# Patient Record
Sex: FEMALE
[De-identification: names, ages, dates, MRNs, and addresses within clinical notes are randomized per-mention and may not be internally consistent; named-entity substitution may affect disease eponyms.]

---

## 2017-06-20 ENCOUNTER — TRANSCRIPTION ENCOUNTER (OUTPATIENT)
Age: 47
End: 2017-06-20

## 2022-06-30 DIAGNOSIS — G47.00 INSOMNIA, UNSPECIFIED: ICD-10-CM

## 2022-10-06 ENCOUNTER — APPOINTMENT (OUTPATIENT)
Dept: GASTROENTEROLOGY | Facility: CLINIC | Age: 52
End: 2022-10-06

## 2022-10-06 ENCOUNTER — NON-APPOINTMENT (OUTPATIENT)
Age: 52
End: 2022-10-06

## 2022-10-06 ENCOUNTER — TRANSCRIPTION ENCOUNTER (OUTPATIENT)
Age: 52
End: 2022-10-06

## 2022-10-06 VITALS
DIASTOLIC BLOOD PRESSURE: 65 MMHG | HEART RATE: 70 BPM | RESPIRATION RATE: 16 BRPM | SYSTOLIC BLOOD PRESSURE: 110 MMHG | BODY MASS INDEX: 20.62 KG/M2 | WEIGHT: 105 LBS | OXYGEN SATURATION: 98 % | HEIGHT: 60 IN

## 2022-10-06 DIAGNOSIS — Z78.9 OTHER SPECIFIED HEALTH STATUS: ICD-10-CM

## 2022-10-06 DIAGNOSIS — Q45.3 OTHER CONGENITAL MALFORMATIONS OF PANCREAS AND PANCREATIC DUCT: ICD-10-CM

## 2022-10-06 DIAGNOSIS — Z00.00 ENCOUNTER FOR GENERAL ADULT MEDICAL EXAMINATION W/OUT ABNORMAL FINDINGS: ICD-10-CM

## 2022-10-06 PROCEDURE — 99203 OFFICE O/P NEW LOW 30 MIN: CPT

## 2022-10-06 NOTE — ASSESSMENT
[FreeTextEntry1] : 52-year-old lady triple biologic failure (Humira, Entyvio, Remicade) doing well on Stelara for the past 2 years.  Slight symptoms for the past few days including looser stool than usual and a little bit of blood this morning after her run.  We are unclear what these mean because otherwise she is feeling well and the labs from May are normal.  Her last colonoscopy in September 2021 showed ulcerations in the cecum and pseudopolyps from 40 to 90 cm but with otherwise normal.

## 2022-10-06 NOTE — HISTORY OF PRESENT ILLNESS
[FreeTextEntry1] : 52-year-old lady history of Crohn's disease and pancreas divisum here to establish care.  Longstanding patient of mine at [facility].\par \par Initial dx:1999\par \par CD: no fistulae, strictures, abscesses, abscesses\par CD because skip lesions but always in colon\par \par Presentation:bloody diarrhea daily\par \par Meds failed: 6MP forever, then 5-6 y no meds at all no flares\par then starting flaring again after had kids\par 1 really bad flare - syncope, blood transfusion - back on 6MP\par Humira - Ab (,1y)\par Entyvio - level could not reach high enough level (<1y)\par Remicade - ?Ab (<1y)\par \par Current meds:Stelara maybe for past 2 y; q8\par Last date administered:8/17/22\par Last level checked:11/21 drug 8.4 Ab 0\par Last colonoscopy:9/21 \par Path + cecum (severely inflamed, ulcers seen), pseudopolyps 40-90\par TI, bx every 10 cm non-spf inflammation only\par Last imaging:MRE 6/22\par pancreas divisum\par all normal\par \par Surgery: no; C sections\par \par BM/D:7-9\par b/w 7-9 am 6-7 times\par then 1-2 times throughout day\par eats mostly in the am and afternoon\par BM/night:0\par Blood:once recently scant\par Mucus:once today - past couple days\par Weight loss:stable\par Fatigue:ok\par \par 15-20 miles a week (4-5 mi at a time)\par runs on treadmill when she can't outside\par \par Joint aches:no\par Skin issues:no\par Eye issues:no; goes regularly\par \par TB:5/22\par HBV:5/22\par \par 5/22/22 vit D 32 - has been taking v dilligently since then\par CMP nl\par Fe studies nl\par B12 283 (200-1100)\par Hb 13.7 MCV 93 plts 148\par TB -ve \par CRP 0.4\par ESR 2\par \par this am stomach bothering a little - pressure, some blood in toilet after run, red\par loose, scant pink in water - brought pic\par no new food no red food\par the other day a piece of asparagus in stool - this happens more when she is flaring\par \par 20 twins, 18

## 2022-10-07 ENCOUNTER — TRANSCRIPTION ENCOUNTER (OUTPATIENT)
Age: 52
End: 2022-10-07

## 2022-10-13 ENCOUNTER — TRANSCRIPTION ENCOUNTER (OUTPATIENT)
Age: 52
End: 2022-10-13

## 2022-10-13 LAB
25(OH)D3 SERPL-MCNC: 52.9 NG/ML
BASOPHILS # BLD AUTO: 0.03 K/UL
BASOPHILS NFR BLD AUTO: 0.4 %
CRP SERPL-MCNC: <3 MG/L
EOSINOPHIL # BLD AUTO: 0.03 K/UL
EOSINOPHIL NFR BLD AUTO: 0.4 %
ERYTHROCYTE [SEDIMENTATION RATE] IN BLOOD BY WESTERGREN METHOD: 5 MM/HR
HCT VFR BLD CALC: 40 %
HGB BLD-MCNC: 13.6 G/DL
IMM GRANULOCYTES NFR BLD AUTO: 0.3 %
LYMPHOCYTES # BLD AUTO: 1.24 K/UL
LYMPHOCYTES NFR BLD AUTO: 18.6 %
MAN DIFF?: NORMAL
MCHC RBC-ENTMCNC: 31.6 PG
MCHC RBC-ENTMCNC: 34 GM/DL
MCV RBC AUTO: 92.8 FL
MONOCYTES # BLD AUTO: 0.5 K/UL
MONOCYTES NFR BLD AUTO: 7.5 %
NEUTROPHILS # BLD AUTO: 4.86 K/UL
NEUTROPHILS NFR BLD AUTO: 72.8 %
PLATELET # BLD AUTO: 183 K/UL
RBC # BLD: 4.31 M/UL
RBC # FLD: 12.3 %
WBC # FLD AUTO: 6.68 K/UL

## 2022-10-20 ENCOUNTER — TRANSCRIPTION ENCOUNTER (OUTPATIENT)
Age: 52
End: 2022-10-20

## 2022-10-20 LAB — CALPROTECTIN FECAL: 36 UG/G

## 2022-10-21 ENCOUNTER — TRANSCRIPTION ENCOUNTER (OUTPATIENT)
Age: 52
End: 2022-10-21

## 2022-10-25 ENCOUNTER — TRANSCRIPTION ENCOUNTER (OUTPATIENT)
Age: 52
End: 2022-10-25

## 2022-10-25 LAB
PROMETHEUS ANSER UST: NORMAL
SERUM USTEKINUMAB CONCENTRATION: 7.7 UG/ML
USTEKINUMAB ANTIBODIES CONCENTRATION: < 1.6 U/ML

## 2022-10-31 ENCOUNTER — TRANSCRIPTION ENCOUNTER (OUTPATIENT)
Age: 52
End: 2022-10-31

## 2022-11-02 ENCOUNTER — TRANSCRIPTION ENCOUNTER (OUTPATIENT)
Age: 52
End: 2022-11-02

## 2022-11-03 ENCOUNTER — TRANSCRIPTION ENCOUNTER (OUTPATIENT)
Age: 52
End: 2022-11-03

## 2022-11-04 ENCOUNTER — TRANSCRIPTION ENCOUNTER (OUTPATIENT)
Age: 52
End: 2022-11-04

## 2022-11-04 RX ORDER — ZOLPIDEM TARTRATE 10 MG/1
10 TABLET ORAL
Qty: 30 | Refills: 3 | Status: ACTIVE | COMMUNITY
Start: 2022-11-04 | End: 1900-01-01

## 2022-11-04 RX ORDER — ZOLPIDEM TARTRATE 10 MG/1
10 TABLET ORAL
Qty: 30 | Refills: 3 | Status: COMPLETED | COMMUNITY
Start: 2022-06-30 | End: 2022-11-04

## 2022-11-07 ENCOUNTER — TRANSCRIPTION ENCOUNTER (OUTPATIENT)
Age: 52
End: 2022-11-07

## 2022-12-30 ENCOUNTER — TRANSCRIPTION ENCOUNTER (OUTPATIENT)
Age: 52
End: 2022-12-30

## 2023-03-01 ENCOUNTER — RX RENEWAL (OUTPATIENT)
Age: 53
End: 2023-03-01

## 2023-03-16 ENCOUNTER — TRANSCRIPTION ENCOUNTER (OUTPATIENT)
Age: 53
End: 2023-03-16

## 2023-03-16 RX ORDER — ZOLPIDEM TARTRATE 10 MG/1
10 TABLET ORAL
Qty: 30 | Refills: 3 | Status: ACTIVE | COMMUNITY
Start: 2023-03-16 | End: 1900-01-01

## 2023-06-08 ENCOUNTER — TRANSCRIPTION ENCOUNTER (OUTPATIENT)
Age: 53
End: 2023-06-08

## 2023-06-09 ENCOUNTER — TRANSCRIPTION ENCOUNTER (OUTPATIENT)
Age: 53
End: 2023-06-09

## 2023-06-14 ENCOUNTER — RX RENEWAL (OUTPATIENT)
Age: 53
End: 2023-06-14

## 2023-06-14 RX ORDER — USTEKINUMAB 90 MG/ML
INJECTION, SOLUTION SUBCUTANEOUS
Qty: 1 | Refills: 12 | Status: ACTIVE | COMMUNITY
Start: 2022-10-06 | End: 1900-01-01

## 2023-07-18 ENCOUNTER — TRANSCRIPTION ENCOUNTER (OUTPATIENT)
Age: 53
End: 2023-07-18

## 2023-08-28 ENCOUNTER — TRANSCRIPTION ENCOUNTER (OUTPATIENT)
Age: 53
End: 2023-08-28

## 2023-09-01 ENCOUNTER — TRANSCRIPTION ENCOUNTER (OUTPATIENT)
Age: 53
End: 2023-09-01

## 2023-09-25 ENCOUNTER — APPOINTMENT (OUTPATIENT)
Dept: GASTROENTEROLOGY | Facility: CLINIC | Age: 53
End: 2023-09-25
Payer: COMMERCIAL

## 2023-09-25 PROCEDURE — 99443: CPT

## 2023-10-19 ENCOUNTER — TRANSCRIPTION ENCOUNTER (OUTPATIENT)
Age: 53
End: 2023-10-19

## 2023-10-26 ENCOUNTER — TRANSCRIPTION ENCOUNTER (OUTPATIENT)
Age: 53
End: 2023-10-26

## 2023-10-27 ENCOUNTER — RX RENEWAL (OUTPATIENT)
Age: 53
End: 2023-10-27

## 2023-10-27 RX ORDER — ZOLPIDEM TARTRATE 10 MG/1
10 TABLET ORAL
Qty: 30 | Refills: 3 | Status: ACTIVE | COMMUNITY
Start: 2023-07-18 | End: 1900-01-01

## 2023-11-01 ENCOUNTER — TRANSCRIPTION ENCOUNTER (OUTPATIENT)
Age: 53
End: 2023-11-01

## 2023-11-01 LAB
25(OH)D3 SERPL-MCNC: 43.4 NG/ML
CRP SERPL-MCNC: <3 MG/L
ERYTHROCYTE [SEDIMENTATION RATE] IN BLOOD BY WESTERGREN METHOD: < 2 MM/HR
FERRITIN SERPL-MCNC: 353 NG/ML
HBV CORE IGM SER QL: NONREACTIVE
HBV SURFACE AB SER QL: NONREACTIVE
HBV SURFACE AG SER QL: NONREACTIVE

## 2023-11-02 ENCOUNTER — TRANSCRIPTION ENCOUNTER (OUTPATIENT)
Age: 53
End: 2023-11-02

## 2023-11-05 ENCOUNTER — TRANSCRIPTION ENCOUNTER (OUTPATIENT)
Age: 53
End: 2023-11-05

## 2023-11-05 LAB
CALPROTECTIN FECAL: 23 UG/G
M TB IFN-G BLD-IMP: NEGATIVE
QUANTIFERON TB PLUS MITOGEN MINUS NIL: 0.71 IU/ML
QUANTIFERON TB PLUS NIL: 0.02 IU/ML
QUANTIFERON TB PLUS TB1 MINUS NIL: -0.01 IU/ML
QUANTIFERON TB PLUS TB2 MINUS NIL: -0.01 IU/ML

## 2023-11-07 ENCOUNTER — TRANSCRIPTION ENCOUNTER (OUTPATIENT)
Age: 53
End: 2023-11-07

## 2023-11-07 LAB
PROMETHEUS ANSER UST: NORMAL
SERUM USTEKINUMAB CONCENTRATION: 10.5 UG/ML
USTEKINUMAB ANTIBODIES CONCENTRATION: < 1.6 U/ML

## 2023-11-08 ENCOUNTER — TRANSCRIPTION ENCOUNTER (OUTPATIENT)
Age: 53
End: 2023-11-08

## 2023-11-09 ENCOUNTER — TRANSCRIPTION ENCOUNTER (OUTPATIENT)
Age: 53
End: 2023-11-09

## 2023-11-20 ENCOUNTER — TRANSCRIPTION ENCOUNTER (OUTPATIENT)
Age: 53
End: 2023-11-20

## 2023-12-03 ENCOUNTER — TRANSCRIPTION ENCOUNTER (OUTPATIENT)
Age: 53
End: 2023-12-03

## 2023-12-07 RX ORDER — SODIUM SULFATE, MAGNESIUM SULFATE, AND POTASSIUM CHLORIDE 17.75; 2.7; 2.25 G/1; G/1; G/1
1479-225-188 TABLET ORAL
Qty: 24 | Refills: 0 | Status: ACTIVE | COMMUNITY
Start: 2023-09-25 | End: 1900-01-01

## 2024-01-22 ENCOUNTER — TRANSCRIPTION ENCOUNTER (OUTPATIENT)
Age: 54
End: 2024-01-22

## 2024-01-22 RX ORDER — NYSTATIN 100000 [USP'U]/ML
100000 SUSPENSION ORAL
Qty: 1 | Refills: 0 | Status: ACTIVE | COMMUNITY
Start: 2024-01-22 | End: 1900-01-01

## 2024-02-08 ENCOUNTER — TRANSCRIPTION ENCOUNTER (OUTPATIENT)
Age: 54
End: 2024-02-08

## 2024-02-09 ENCOUNTER — TRANSCRIPTION ENCOUNTER (OUTPATIENT)
Age: 54
End: 2024-02-09

## 2024-02-09 RX ORDER — ONDANSETRON 8 MG/1
8 TABLET, ORALLY DISINTEGRATING ORAL
Qty: 10 | Refills: 0 | Status: ACTIVE | COMMUNITY
Start: 2024-02-09 | End: 1900-01-01

## 2024-02-29 ENCOUNTER — TRANSCRIPTION ENCOUNTER (OUTPATIENT)
Age: 54
End: 2024-02-29

## 2024-03-01 ENCOUNTER — RESULT REVIEW (OUTPATIENT)
Age: 54
End: 2024-03-01

## 2024-03-01 ENCOUNTER — APPOINTMENT (OUTPATIENT)
Dept: GASTROENTEROLOGY | Facility: GI CENTER | Age: 54
End: 2024-03-01
Payer: COMMERCIAL

## 2024-03-01 ENCOUNTER — OUTPATIENT (OUTPATIENT)
Dept: OUTPATIENT SERVICES | Facility: HOSPITAL | Age: 54
LOS: 1 days | End: 2024-03-01
Payer: COMMERCIAL

## 2024-03-01 DIAGNOSIS — R12 HEARTBURN: ICD-10-CM

## 2024-03-01 DIAGNOSIS — K50.10 CROHN'S DISEASE OF LARGE INTESTINE WITHOUT COMPLICATIONS: ICD-10-CM

## 2024-03-01 PROCEDURE — 88342 IMHCHEM/IMCYTCHM 1ST ANTB: CPT | Mod: 26

## 2024-03-01 PROCEDURE — 88342 IMHCHEM/IMCYTCHM 1ST ANTB: CPT

## 2024-03-01 PROCEDURE — 45380 COLONOSCOPY AND BIOPSY: CPT

## 2024-03-01 PROCEDURE — 88305 TISSUE EXAM BY PATHOLOGIST: CPT | Mod: 26

## 2024-03-01 PROCEDURE — 43239 EGD BIOPSY SINGLE/MULTIPLE: CPT

## 2024-03-01 PROCEDURE — 88305 TISSUE EXAM BY PATHOLOGIST: CPT

## 2024-03-01 RX ORDER — OMEPRAZOLE 40 MG/1
40 CAPSULE, DELAYED RELEASE ORAL
Qty: 30 | Refills: 1 | Status: ACTIVE | COMMUNITY
Start: 2024-03-01 | End: 1900-01-01

## 2024-03-03 ENCOUNTER — RX RENEWAL (OUTPATIENT)
Age: 54
End: 2024-03-03

## 2024-03-03 RX ORDER — ZOLPIDEM TARTRATE 10 MG/1
10 TABLET ORAL
Qty: 30 | Refills: 3 | Status: ACTIVE | COMMUNITY
Start: 2023-11-20 | End: 1900-01-01

## 2024-03-06 LAB — SURGICAL PATHOLOGY STUDY: SIGNIFICANT CHANGE UP

## 2024-03-07 ENCOUNTER — TRANSCRIPTION ENCOUNTER (OUTPATIENT)
Age: 54
End: 2024-03-07

## 2024-03-28 ENCOUNTER — TRANSCRIPTION ENCOUNTER (OUTPATIENT)
Age: 54
End: 2024-03-28

## 2024-03-28 RX ORDER — ZOLPIDEM TARTRATE 10 MG/1
10 TABLET ORAL
Qty: 30 | Refills: 3 | Status: ACTIVE | COMMUNITY
Start: 2024-03-28 | End: 1900-01-01

## 2024-03-29 ENCOUNTER — TRANSCRIPTION ENCOUNTER (OUTPATIENT)
Age: 54
End: 2024-03-29

## 2024-04-04 NOTE — ASSESSMENT
[FreeTextEntry1] : 53 F CD panc divisum on Stelara, feels "great" though this means 6-10 bm/d, telephonic for f/u.

## 2024-04-04 NOTE — ADDENDUM
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 Pseudopolyps 40-90 cm;  Path Ileum nl 90 cm severely active ch colitis with ulceration 80 cm non-spf postinflammatory changes 70 cm normal 60 cm non-spf postinflammatory changes 50 cm non-spf postinflammatory changes 40 cm nl 30 cm nl 20 cm nl 10 cm non-spf postinflammatory changes  6/2020 Colon: pseudopolyps 40-80 cm. Poor prep.  Path: ileum nl 90 cm mod active ch colitis 80 cm inactive ch colitis + granuloma 70 cm inactive ch colitis 60 cm mildy active ch colitis 50 cm severely active ch colitis  40 cm inactive ch colitis  30 cm inactive ch colitis  20 cm inactive ch colitis rectum moderatively active ch colitis   10/2020 6MMP 70408, nl <500; 6TG 372 (nl) MRE 2022: pancreas divisum. No active bowel inflammation.  CT 2019: desc colon thickening new since last scan 8/2018. Persistent thickening of the ascending and transverse.

## 2024-04-04 NOTE — ASSESSMENT
[FreeTextEntry1] : 53 F CD, pancreas divisum, telephonic for f/u. On [facility].   The patient is medically optimized for procedure(s). All questions have been answered. The risks and benefits of the procedure have been discussed and the patient signed consent for the procedure. Preliminary results will be discussed when the patient wakes up from anesthesia, but definitive results will be discussed after the pathology report is issued in 5 business days.

## 2024-04-04 NOTE — PLAN
[TextEntry] : 1. labs 2. colon  3. bloat - t/c MRE earlier than 2024 if this continues; meanwhile recommended Visbiome and continued observation 4. still don't have [facility] chart, we have partial records from me copying them off her phone but she will look into getting whole chart transferred

## 2024-04-04 NOTE — HISTORY OF PRESENT ILLNESS
[FreeTextEntry1] : Visit type: Telephonic (phone only) Patient Location: [place], [place] Provider Location: [address] [place] [address] Consent obtained for visit:Yes Visit length: 30 minutes Others present: No  53 F CD, pancreas divisum, telephonic for f/u.   Initial dx:1999 CD: no fistulae, strictures, abscesses, abscesses CD because skip lesions but always in colon Presentation: bloody diarrhea daily, abdominal pain, weight loss Meds failed: 6MP forever, then 5-6 y no meds at all no flares; then starting flaring again after had kids; 1 really bad flare - syncope, blood transfusion - back on 6MP; Humira - Ab (,1y); Entyvio - primary non-responder (<1y); Remicade - ?Ab (<1y)  Current meds: Stelara (7/2020-; q8  Last date administered: 11 Sept 2022 (HA 2 days after last 3 infusions not this time)  Last level checked: Stelara 10/22 drug 7.7 Ab 0.0 Stelara 10/2020 drug 7.7 Ab <1.6 11/2021 drug 8.4 Ab <1.6  Entyvio level 12/18 21.1 Ab <1.6 1/2019 11.6 Ab 4.1 3/2019 drug 7.2 Ab <1.6 6/2019 drug 15.3 Ab <1.6 8/2019 drug 14.9 Ab <1.6  Remicade level 11/2019 drug >34 Ab <3.1 5/2020 remicade <1 Ab 50 6/2020 remicade  <1 Ab >100  Last colonoscopy:9/21  Path + cecum ( inflamed, ulcers seen), pseudopolyps 40-90  TI, bx every 10 cm non-spf inflammation only   9/2021 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 Pseudopolyps 40-90 cm;  Path Ileum nl 90 cm severely active ch colitis with ulceration 80 cm non-spf postinflammatory changes 70 cm normal 60 cm non-spf postinflammatory changes 50 cm non-spf postinflammatory changes 40 cm nl 30 cm nl 20 cm nl 10 cm non-spf postinflammatory changes  6/2020 Colon: pseudopolyps 40-80 cm. Poor prep.  Path: ileum nl 90 cm mod active ch colitis 80 cm inactive ch colitis + granuloma 70 cm inactive ch colitis 60 cm mildy active ch colitis 50 cm severely active ch colitis  40 cm inactive ch colitis  30 cm inactive ch colitis  20 cm inactive ch colitis rectum moderatively active ch colitis  Last imaging: MRE 6/22  pancreas divisum  all normal (no active bowel inflammation).   CT 2019: desc colon thickening new since last scan 8/2018. Persistent thickening of the ascending and transverse.  Surgery: no; C sections  BM/D:6-10  b/w 7-9 am 6-7 times  then 1-2 times throughout day  eats mostly in the am and afternoon; later she eats harder the next morning is  BM/night:0  Blood: no  Mucus: no  Weight loss: stable  Fatigue: ok    Joint aches:no  Skin issues:no  Eye issues:no; goes regularly    TB:5/22  HBV:5/22  10/22 nl CBC Hb 13.6,CRP <3, ESR 5 , calpro 36; vit D 52.9  bloated v frequently ?digestive enzymes no pain  [facility] records: Fe sat 2019 10% (nl >15%)   Calpro 9//2018 1387.3 12/18 1617 3/2019 456 10/2020 6MMP 64659, nl <500; 6TG 372 (nl)

## 2024-04-04 NOTE — HISTORY OF PRESENT ILLNESS
[FreeTextEntry1] :  53 F CD, pancreas divisum, [facility].

## 2024-05-09 ENCOUNTER — TRANSCRIPTION ENCOUNTER (OUTPATIENT)
Age: 54
End: 2024-05-09

## 2024-06-10 ENCOUNTER — TRANSCRIPTION ENCOUNTER (OUTPATIENT)
Age: 54
End: 2024-06-10

## 2024-06-12 ENCOUNTER — TRANSCRIPTION ENCOUNTER (OUTPATIENT)
Age: 54
End: 2024-06-12

## 2024-06-13 RX ORDER — USTEKINUMAB 90 MG/ML
90 INJECTION, SOLUTION SUBCUTANEOUS
Qty: 1 | Refills: 12 | Status: ACTIVE | COMMUNITY
Start: 2024-06-10 | End: 1900-01-01

## 2024-06-14 ENCOUNTER — TRANSCRIPTION ENCOUNTER (OUTPATIENT)
Age: 54
End: 2024-06-14

## 2024-06-14 LAB
ALBUMIN SERPL ELPH-MCNC: 4.9 G/DL
ALP BLD-CCNC: 90 U/L
ALT SERPL-CCNC: 22 U/L
ANION GAP SERPL CALC-SCNC: 16 MMOL/L
AST SERPL-CCNC: 33 U/L
BILIRUB SERPL-MCNC: 2.8 MG/DL
BUN SERPL-MCNC: 21 MG/DL
CALCIUM SERPL-MCNC: 10 MG/DL
CHLORIDE SERPL-SCNC: 100 MMOL/L
CO2 SERPL-SCNC: 24 MMOL/L
CREAT SERPL-MCNC: 1.01 MG/DL
EGFR: 66 ML/MIN/1.73M2
FERRITIN SERPL-MCNC: 629 NG/ML
GLUCOSE SERPL-MCNC: 104 MG/DL
HCT VFR BLD CALC: 42.8 %
HGB BLD-MCNC: 14.7 G/DL
IRON SATN MFR SERPL: 62 %
IRON SERPL-MCNC: 201 UG/DL
MCHC RBC-ENTMCNC: 30.9 PG
MCHC RBC-ENTMCNC: 34.3 GM/DL
MCV RBC AUTO: 89.9 FL
PLATELET # BLD AUTO: 181 K/UL
POTASSIUM SERPL-SCNC: 5.6 MMOL/L
PROT SERPL-MCNC: 7.2 G/DL
RBC # BLD: 4.76 M/UL
RBC # FLD: 12.1 %
SODIUM SERPL-SCNC: 140 MMOL/L
TIBC SERPL-MCNC: 327 UG/DL
UIBC SERPL-MCNC: 126 UG/DL
WBC # FLD AUTO: 6.74 K/UL

## 2024-06-15 ENCOUNTER — TRANSCRIPTION ENCOUNTER (OUTPATIENT)
Age: 54
End: 2024-06-15

## 2024-06-16 ENCOUNTER — TRANSCRIPTION ENCOUNTER (OUTPATIENT)
Age: 54
End: 2024-06-16

## 2024-06-17 ENCOUNTER — NON-APPOINTMENT (OUTPATIENT)
Age: 54
End: 2024-06-17

## 2024-06-21 ENCOUNTER — TRANSCRIPTION ENCOUNTER (OUTPATIENT)
Age: 54
End: 2024-06-21

## 2024-06-28 ENCOUNTER — APPOINTMENT (OUTPATIENT)
Dept: GASTROENTEROLOGY | Facility: CLINIC | Age: 54
End: 2024-06-28
Payer: COMMERCIAL

## 2024-06-28 ENCOUNTER — TRANSCRIPTION ENCOUNTER (OUTPATIENT)
Age: 54
End: 2024-06-28

## 2024-06-28 DIAGNOSIS — K50.10 CROHN'S DISEASE OF LARGE INTESTINE W/OUT COMPLICATIONS: ICD-10-CM

## 2024-06-28 DIAGNOSIS — E83.19 OTHER DISORDERS OF IRON METABOLISM: ICD-10-CM

## 2024-06-28 PROCEDURE — 99443: CPT

## 2024-07-03 ENCOUNTER — RX RENEWAL (OUTPATIENT)
Age: 54
End: 2024-07-03

## 2024-07-16 ENCOUNTER — TRANSCRIPTION ENCOUNTER (OUTPATIENT)
Age: 54
End: 2024-07-16

## 2024-07-18 ENCOUNTER — TRANSCRIPTION ENCOUNTER (OUTPATIENT)
Age: 54
End: 2024-07-18

## 2024-07-18 ENCOUNTER — RX RENEWAL (OUTPATIENT)
Age: 54
End: 2024-07-18

## 2024-07-26 ENCOUNTER — TRANSCRIPTION ENCOUNTER (OUTPATIENT)
Age: 54
End: 2024-07-26

## 2024-07-26 RX ORDER — ZOLPIDEM TARTRATE 10 MG/1
10 TABLET ORAL
Qty: 30 | Refills: 3 | Status: ACTIVE | COMMUNITY
Start: 2024-07-26 | End: 1900-01-01

## 2024-08-13 ENCOUNTER — OUTPATIENT (OUTPATIENT)
Dept: OUTPATIENT SERVICES | Facility: HOSPITAL | Age: 54
LOS: 1 days | Discharge: HOME | End: 2024-08-13

## 2024-08-13 DIAGNOSIS — E83.10 DISORDER OF IRON METABOLISM, UNSPECIFIED: ICD-10-CM

## 2024-08-21 ENCOUNTER — NON-APPOINTMENT (OUTPATIENT)
Age: 54
End: 2024-08-21

## 2024-08-22 ENCOUNTER — APPOINTMENT (OUTPATIENT)
Dept: HEMATOLOGY ONCOLOGY | Facility: CLINIC | Age: 54
End: 2024-08-22

## 2024-08-22 ENCOUNTER — RESULT REVIEW (OUTPATIENT)
Age: 54
End: 2024-08-22

## 2024-08-22 VITALS
HEART RATE: 64 BPM | WEIGHT: 105.16 LBS | TEMPERATURE: 97.9 F | DIASTOLIC BLOOD PRESSURE: 92 MMHG | SYSTOLIC BLOOD PRESSURE: 142 MMHG | RESPIRATION RATE: 16 BRPM | OXYGEN SATURATION: 98 % | BODY MASS INDEX: 21.49 KG/M2 | HEIGHT: 58.66 IN

## 2024-08-22 LAB
ALBUMIN SERPL ELPH-MCNC: 4.9 G/DL
ALP BLD-CCNC: 109 U/L
ALT SERPL-CCNC: 37 U/L
ANION GAP SERPL CALC-SCNC: 16 MMOL/L
AST SERPL-CCNC: 53 U/L
BASOPHILS # BLD AUTO: 0.04 K/UL — SIGNIFICANT CHANGE UP (ref 0–0.2)
BASOPHILS NFR BLD AUTO: 0.4 % — SIGNIFICANT CHANGE UP (ref 0–2)
BILIRUB SERPL-MCNC: 1.8 MG/DL
BUN SERPL-MCNC: 23 MG/DL
CALCIUM SERPL-MCNC: 10.2 MG/DL
CHLORIDE SERPL-SCNC: 101 MMOL/L
CO2 SERPL-SCNC: 25 MMOL/L
CREAT SERPL-MCNC: 0.88 MG/DL
CRP SERPL-MCNC: <3 MG/L
EGFR: 78 ML/MIN/1.73M2
EOSINOPHIL # BLD AUTO: 0.03 K/UL — SIGNIFICANT CHANGE UP (ref 0–0.5)
EOSINOPHIL NFR BLD AUTO: 0.3 % — SIGNIFICANT CHANGE UP (ref 0–6)
ERYTHROCYTE [SEDIMENTATION RATE] IN BLOOD: 6 MM/HR — SIGNIFICANT CHANGE UP (ref 0–20)
FERRITIN SERPL-MCNC: 410 NG/ML
GLUCOSE SERPL-MCNC: 104 MG/DL
HCT VFR BLD CALC: 40.5 % — SIGNIFICANT CHANGE UP (ref 34.5–45)
HGB BLD-MCNC: 13.9 G/DL — SIGNIFICANT CHANGE UP (ref 11.5–15.5)
IMM GRANULOCYTES NFR BLD AUTO: 0.2 % — SIGNIFICANT CHANGE UP (ref 0–0.9)
IRON SATN MFR SERPL: 29 %
IRON SERPL-MCNC: 91 UG/DL
LYMPHOCYTES # BLD AUTO: 1.61 K/UL — SIGNIFICANT CHANGE UP (ref 1–3.3)
LYMPHOCYTES # BLD AUTO: 17.5 % — SIGNIFICANT CHANGE UP (ref 13–44)
MCHC RBC-ENTMCNC: 31.1 PG — SIGNIFICANT CHANGE UP (ref 27–34)
MCHC RBC-ENTMCNC: 34.3 G/DL — SIGNIFICANT CHANGE UP (ref 32–36)
MCV RBC AUTO: 90.6 FL — SIGNIFICANT CHANGE UP (ref 80–100)
MONOCYTES # BLD AUTO: 0.72 K/UL — SIGNIFICANT CHANGE UP (ref 0–0.9)
MONOCYTES NFR BLD AUTO: 7.8 % — SIGNIFICANT CHANGE UP (ref 2–14)
NEUTROPHILS # BLD AUTO: 6.77 K/UL — SIGNIFICANT CHANGE UP (ref 1.8–7.4)
NEUTROPHILS NFR BLD AUTO: 73.8 % — SIGNIFICANT CHANGE UP (ref 43–77)
NRBC # BLD: 0 /100 WBCS — SIGNIFICANT CHANGE UP (ref 0–0)
NRBC BLD-RTO: 0 /100 WBCS — SIGNIFICANT CHANGE UP (ref 0–0)
PLATELET # BLD AUTO: 220 K/UL — SIGNIFICANT CHANGE UP (ref 150–400)
POTASSIUM SERPL-SCNC: 5.4 MMOL/L
PROT SERPL-MCNC: 7.4 G/DL
RBC # BLD: 4.47 M/UL — SIGNIFICANT CHANGE UP (ref 3.8–5.2)
RBC # FLD: 12.5 % — SIGNIFICANT CHANGE UP (ref 10.3–14.5)
SODIUM SERPL-SCNC: 142 MMOL/L
TIBC SERPL-MCNC: 313 UG/DL
UIBC SERPL-MCNC: 222 UG/DL
WBC # BLD: 9.19 K/UL — SIGNIFICANT CHANGE UP (ref 3.8–10.5)
WBC # FLD AUTO: 9.19 K/UL — SIGNIFICANT CHANGE UP (ref 3.8–10.5)

## 2024-08-22 PROCEDURE — 99204 OFFICE O/P NEW MOD 45 MIN: CPT

## 2024-08-22 NOTE — HISTORY OF PRESENT ILLNESS
[de-identified] : Initial Consultation on 8/22/2024 Referred by: Dr. Gonzalez, GI CC: Elevated iron level   HPI: 54-year-old woman with Crohn's Disease (diagnosed in 1999, stable on Stelara) and osteopenia here for evaluation of elevated iron level.    Recent CBC on 6/11/2024 showed WBC 6.74, HG 14.7, HCT 42.8, MCV 89.9 and ,000.   Ferritin level 629 on 6/11/2023     PMHx:   PSHx:   Past OB/Gyn:   Hospitalizations:   Medications: See List.  Medications reconciled   Allergies: NKDA   Social History: . Works as a Denies smoking Occasional alcohol use Born in [place] Parents are from   Family History: Mother Father   Healthcare Maintenance: Primary care doctor- Last colonoscopy- Last endoscopy- Last mammogram- Last gyn exam-

## 2024-08-23 LAB
HGB A MFR BLD: 97.9 %
HGB A2 MFR BLD: 2.1 %
HGB FRACT BLD-IMP: NORMAL
TM INTERPRETATION: NORMAL

## 2024-09-03 ENCOUNTER — NON-APPOINTMENT (OUTPATIENT)
Age: 54
End: 2024-09-03

## 2024-09-11 ENCOUNTER — TRANSCRIPTION ENCOUNTER (OUTPATIENT)
Age: 54
End: 2024-09-11

## 2024-09-13 ENCOUNTER — TRANSCRIPTION ENCOUNTER (OUTPATIENT)
Age: 54
End: 2024-09-13

## 2024-09-26 ENCOUNTER — TRANSCRIPTION ENCOUNTER (OUTPATIENT)
Age: 54
End: 2024-09-26

## 2024-10-15 ENCOUNTER — APPOINTMENT (OUTPATIENT)
Dept: MRI IMAGING | Facility: CLINIC | Age: 54
End: 2024-10-15
Payer: COMMERCIAL

## 2024-10-15 ENCOUNTER — OUTPATIENT (OUTPATIENT)
Dept: OUTPATIENT SERVICES | Facility: HOSPITAL | Age: 54
LOS: 1 days | End: 2024-10-15
Payer: COMMERCIAL

## 2024-10-15 ENCOUNTER — TRANSCRIPTION ENCOUNTER (OUTPATIENT)
Age: 54
End: 2024-10-15

## 2024-10-15 DIAGNOSIS — K50.10 CROHN'S DISEASE OF LARGE INTESTINE WITHOUT COMPLICATIONS: ICD-10-CM

## 2024-10-15 PROCEDURE — 74183 MRI ABD W/O CNTR FLWD CNTR: CPT | Mod: 26

## 2024-10-15 PROCEDURE — 72197 MRI PELVIS W/O & W/DYE: CPT

## 2024-10-15 PROCEDURE — A9585: CPT

## 2024-10-15 PROCEDURE — 72197 MRI PELVIS W/O & W/DYE: CPT | Mod: 26

## 2024-10-15 PROCEDURE — 74183 MRI ABD W/O CNTR FLWD CNTR: CPT

## 2024-10-15 RX ORDER — ALPRAZOLAM 0.25 MG/1
0.25 TABLET ORAL ONCE
Qty: 2 | Refills: 0 | Status: ACTIVE | COMMUNITY
Start: 2024-10-15 | End: 1900-01-01

## 2024-10-18 ENCOUNTER — TRANSCRIPTION ENCOUNTER (OUTPATIENT)
Age: 54
End: 2024-10-18

## 2024-10-23 ENCOUNTER — TRANSCRIPTION ENCOUNTER (OUTPATIENT)
Age: 54
End: 2024-10-23

## 2024-11-20 ENCOUNTER — TRANSCRIPTION ENCOUNTER (OUTPATIENT)
Age: 54
End: 2024-11-20

## 2024-11-20 RX ORDER — ALPRAZOLAM 0.25 MG/1
0.25 TABLET ORAL
Qty: 30 | Refills: 1 | Status: ACTIVE | COMMUNITY
Start: 2024-11-20 | End: 1900-01-01

## 2024-11-26 ENCOUNTER — TRANSCRIPTION ENCOUNTER (OUTPATIENT)
Age: 54
End: 2024-11-26

## 2024-11-26 RX ORDER — ZOLPIDEM TARTRATE 10 MG/1
10 TABLET ORAL
Qty: 30 | Refills: 3 | Status: ACTIVE | COMMUNITY
Start: 2024-11-26 | End: 1900-01-01

## 2024-11-27 ENCOUNTER — TRANSCRIPTION ENCOUNTER (OUTPATIENT)
Age: 54
End: 2024-11-27

## 2025-02-18 ENCOUNTER — TRANSCRIPTION ENCOUNTER (OUTPATIENT)
Age: 55
End: 2025-02-18

## 2025-02-20 ENCOUNTER — TRANSCRIPTION ENCOUNTER (OUTPATIENT)
Age: 55
End: 2025-02-20

## 2025-02-20 RX ORDER — ZOLPIDEM TARTRATE 10 MG/1
10 TABLET ORAL
Qty: 30 | Refills: 3 | Status: ACTIVE | COMMUNITY
Start: 2025-02-20 | End: 1900-01-01

## 2025-02-26 ENCOUNTER — TRANSCRIPTION ENCOUNTER (OUTPATIENT)
Age: 55
End: 2025-02-26

## 2025-03-08 ENCOUNTER — TRANSCRIPTION ENCOUNTER (OUTPATIENT)
Age: 55
End: 2025-03-08

## 2025-03-11 ENCOUNTER — TRANSCRIPTION ENCOUNTER (OUTPATIENT)
Age: 55
End: 2025-03-11

## 2025-03-11 ENCOUNTER — NON-APPOINTMENT (OUTPATIENT)
Age: 55
End: 2025-03-11

## 2025-04-02 ENCOUNTER — APPOINTMENT (OUTPATIENT)
Dept: GASTROENTEROLOGY | Facility: CLINIC | Age: 55
End: 2025-04-02
Payer: COMMERCIAL

## 2025-04-02 DIAGNOSIS — K50.10 CROHN'S DISEASE OF LARGE INTESTINE W/OUT COMPLICATIONS: ICD-10-CM

## 2025-04-02 PROCEDURE — 99214 OFFICE O/P EST MOD 30 MIN: CPT | Mod: 95

## 2025-04-02 PROCEDURE — G2211 COMPLEX E/M VISIT ADD ON: CPT | Mod: NC,95

## 2025-04-03 ENCOUNTER — TRANSCRIPTION ENCOUNTER (OUTPATIENT)
Age: 55
End: 2025-04-03

## 2025-05-01 ENCOUNTER — TRANSCRIPTION ENCOUNTER (OUTPATIENT)
Age: 55
End: 2025-05-01

## 2025-05-05 ENCOUNTER — TRANSCRIPTION ENCOUNTER (OUTPATIENT)
Age: 55
End: 2025-05-05

## 2025-05-17 ENCOUNTER — RX RENEWAL (OUTPATIENT)
Age: 55
End: 2025-05-17

## 2025-06-05 ENCOUNTER — TRANSCRIPTION ENCOUNTER (OUTPATIENT)
Age: 55
End: 2025-06-05

## 2025-06-11 ENCOUNTER — TRANSCRIPTION ENCOUNTER (OUTPATIENT)
Age: 55
End: 2025-06-11

## 2025-06-11 RX ORDER — ZOLPIDEM TARTRATE 10 MG/1
10 TABLET ORAL
Qty: 30 | Refills: 3 | Status: ACTIVE | OUTPATIENT
Start: 2025-06-11

## 2025-06-18 ENCOUNTER — TRANSCRIPTION ENCOUNTER (OUTPATIENT)
Age: 55
End: 2025-06-18

## 2025-07-29 ENCOUNTER — RX RENEWAL (OUTPATIENT)
Age: 55
End: 2025-07-29

## 2025-07-30 ENCOUNTER — APPOINTMENT (OUTPATIENT)
Dept: INTERNAL MEDICINE | Facility: CLINIC | Age: 55
End: 2025-07-30

## 2025-08-30 ENCOUNTER — TRANSCRIPTION ENCOUNTER (OUTPATIENT)
Age: 55
End: 2025-08-30

## 2025-09-11 ENCOUNTER — RX RENEWAL (OUTPATIENT)
Age: 55
End: 2025-09-11

## (undated) DEVICE — FORCEP ENDOJAW AGTR LC W NDL 2.8MM 230CM DISP

## (undated) DEVICE — VALVE ENDO SURESEAL II 0-5FR